# Patient Record
Sex: MALE | Race: WHITE | NOT HISPANIC OR LATINO | URBAN - METROPOLITAN AREA
[De-identification: names, ages, dates, MRNs, and addresses within clinical notes are randomized per-mention and may not be internally consistent; named-entity substitution may affect disease eponyms.]

---

## 2024-03-17 ENCOUNTER — EMERGENCY (EMERGENCY)
Facility: HOSPITAL | Age: 29
LOS: 1 days | Discharge: HOME | End: 2024-03-17
Admitting: EMERGENCY MEDICINE
Payer: COMMERCIAL

## 2024-03-17 VITALS
SYSTOLIC BLOOD PRESSURE: 128 MMHG | HEART RATE: 67 BPM | TEMPERATURE: 98 F | RESPIRATION RATE: 16 BRPM | OXYGEN SATURATION: 97 % | DIASTOLIC BLOOD PRESSURE: 77 MMHG

## 2024-03-17 VITALS
RESPIRATION RATE: 17 BRPM | DIASTOLIC BLOOD PRESSURE: 77 MMHG | HEART RATE: 67 BPM | OXYGEN SATURATION: 97 % | SYSTOLIC BLOOD PRESSURE: 130 MMHG

## 2024-03-17 PROCEDURE — 70450 CT HEAD/BRAIN W/O DYE: CPT | Mod: 26,MC

## 2024-03-17 PROCEDURE — 70486 CT MAXILLOFACIAL W/O DYE: CPT | Mod: 26,MC

## 2024-03-17 PROCEDURE — 99284 EMERGENCY DEPT VISIT MOD MDM: CPT

## 2024-03-17 RX ORDER — KETOROLAC TROMETHAMINE 30 MG/ML
30 SYRINGE (ML) INJECTION ONCE
Refills: 0 | Status: DISCONTINUED | OUTPATIENT
Start: 2024-03-17 | End: 2024-03-17

## 2024-03-17 RX ORDER — METOCLOPRAMIDE HCL 10 MG
10 TABLET ORAL ONCE
Refills: 0 | Status: COMPLETED | OUTPATIENT
Start: 2024-03-17 | End: 2024-03-17

## 2024-03-17 RX ORDER — METOCLOPRAMIDE HCL 10 MG
1 TABLET ORAL
Qty: 6 | Refills: 0
Start: 2024-03-17 | End: 2024-03-19

## 2024-03-17 RX ADMIN — Medication 10 MILLIGRAM(S): at 12:43

## 2024-03-17 NOTE — ED ADULT NURSE NOTE - OBJECTIVE STATEMENT
Pt is a 29 male complaining of headache x 10- 12 days. Pt states that he has been on multiple antibiotics for suspected wisdom tooth/ sinus infection. PT states that headache started after finishing  latest dose.

## 2024-03-17 NOTE — ED PROVIDER NOTE - PATIENT PORTAL LINK FT
You can access the FollowMyHealth Patient Portal offered by [facility] by registering at the following website: [email or web address]. By joining [facility]’s FollowMyHealth portal, you will also be able to view your health information using other applications (apps) compatible with our system.

## 2024-03-17 NOTE — ED PROVIDER NOTE - OBJECTIVE STATEMENT
29-year-old male presents today with complaint of headache x 11 days.  Patient states that he has been having sinus problems.  States that 2 months ago he had his wisdom teeth taken out in [place] and was originally told that he did not need antibiotics and then subsequently told that it looked infected and he was started on antibiotics.  Later he noticed a weird smell in his nose and was seen by an ENT doctor who prescribed him to antibiotics he does not recall which ones for a sinus infection.  States that he had finished these antibiotics around the time that his headache started.  He denies fever, chills, vision changes, nausea, vomiting, abdominal pain, cough, sore throat, difficulty swallowing.  He took naproxen and cyclobenzaprine without relief.

## 2024-03-17 NOTE — ED ADULT NURSE NOTE - NSFALLUNIVINTERV_ED_ALL_ED
Bed/Stretcher in lowest position, wheels locked, appropriate side rails in place/Call bell, personal items and telephone in reach/Instruct patient to call for assistance before getting out of bed/chair/stretcher/Non-slip footwear applied when patient is off stretcher/[place] to call system/Physically safe environment - no spills, clutter or unnecessary equipment/Purposeful proactive rounding/Room/bathroom lighting operational, light cord in reach

## 2024-03-17 NOTE — ED PROVIDER NOTE - CARE PROVIDER_API CALL
Donell Felix  Otolaryngology  [address]  [place] [address]  Phone: [phone]  Fax: [phone]  Follow Up Time:

## 2024-03-17 NOTE — ED PROVIDER NOTE - ENMT, MLM
Airway patent, Nasal mucosa clear. Mouth with normal mucosa. Throat has no vesicles, no oropharyngeal exudates and uvula is midline. no sinus tenderness to percussion.

## 2024-03-17 NOTE — ED PROVIDER NOTE - CLINICAL SUMMARY MEDICAL DECISION MAKING FREE TEXT BOX
Patient presents with complaint of headache.  He was diagnosed with a sinus infection shortly after having his wisdom teeth removed and has been on a number of antibiotics which the patient does not know.  He was most recently seen for his headache a couple of days ago and was prescribed naproxen and Flexeril without improvement.  So he decided to come to the ER to request imaging to make sure there is nothing more serious happening.  He has no neurologic deficits on examination.  CT is unremarkable except for mild mucosal thickening of the left maxillary sinus which is consistent with him having treated his sinus infection.  He has no tenderness to percussion over that sinus actively.  Discussed all results and advised follow-up with his ENT doctor.  Patient verbalizes understanding.

## 2024-03-18 PROBLEM — Z00.00 ENCOUNTER FOR PREVENTIVE HEALTH EXAMINATION: Status: ACTIVE | Noted: 2024-03-18

## 2024-03-18 PROBLEM — Z78.9 OTHER SPECIFIED HEALTH STATUS: Chronic | Status: ACTIVE | Noted: 2024-03-17

## 2024-03-19 DIAGNOSIS — R51.9 HEADACHE, UNSPECIFIED: ICD-10-CM

## 2024-03-28 ENCOUNTER — APPOINTMENT (OUTPATIENT)
Dept: OTOLARYNGOLOGY | Facility: CLINIC | Age: 29
End: 2024-03-28

## 2024-05-03 ENCOUNTER — EMERGENCY (EMERGENCY)
Facility: HOSPITAL | Age: 29
LOS: 1 days | Discharge: LEFT BEFORE DISCHARGE | End: 2024-05-03
Admitting: EMERGENCY MEDICINE
Payer: SELF-PAY

## 2024-05-03 PROCEDURE — L9992: CPT

## 2024-05-06 DIAGNOSIS — Z53.21 PROCEDURE AND TREATMENT NOT CARRIED OUT DUE TO PATIENT LEAVING PRIOR TO BEING SEEN BY HEALTH CARE PROVIDER: ICD-10-CM

## 2024-11-16 NOTE — ED ADULT NURSE NOTE - CHIEF COMPLAINT QUOTE
Patient declined an EKG.VSS.Pain medication administered per MAR.   Headache x 11 days. sent in by md for ct scan. Denies falls or trauma or fevers.